# Patient Record
Sex: FEMALE | Race: OTHER | Employment: FULL TIME | URBAN - METROPOLITAN AREA
[De-identification: names, ages, dates, MRNs, and addresses within clinical notes are randomized per-mention and may not be internally consistent; named-entity substitution may affect disease eponyms.]

---

## 2021-07-19 PROCEDURE — 99283 EMERGENCY DEPT VISIT LOW MDM: CPT

## 2021-07-20 ENCOUNTER — APPOINTMENT (OUTPATIENT)
Dept: GENERAL RADIOLOGY | Age: 21
End: 2021-07-20

## 2021-07-20 ENCOUNTER — HOSPITAL ENCOUNTER (OUTPATIENT)
Age: 21
Setting detail: OBSERVATION
Discharge: HOME | End: 2021-07-20
Attending: EMERGENCY MEDICINE | Admitting: OBSTETRICS & GYNECOLOGY

## 2021-07-20 VITALS
DIASTOLIC BLOOD PRESSURE: 58 MMHG | RESPIRATION RATE: 18 BRPM | BODY MASS INDEX: 29.71 KG/M2 | HEART RATE: 78 BPM | OXYGEN SATURATION: 100 % | TEMPERATURE: 98.1 F | WEIGHT: 141.54 LBS | SYSTOLIC BLOOD PRESSURE: 101 MMHG | HEIGHT: 58 IN

## 2021-07-20 DIAGNOSIS — B00.9 HSV (HERPES SIMPLEX VIRUS) INFECTION: ICD-10-CM

## 2021-07-20 DIAGNOSIS — N73.9 FEMALE PELVIC INFLAMMATORY DISEASE: Primary | ICD-10-CM

## 2021-07-20 DIAGNOSIS — A41.9 SEPTICEMIA (HCC): ICD-10-CM

## 2021-07-20 PROBLEM — A60.04 HERPES, VULVAR: Status: ACTIVE | Noted: 2021-07-20

## 2021-07-20 LAB
A/G RATIO: 1.2 (ref 1.1–2.2)
ALBUMIN SERPL-MCNC: 4.2 G/DL (ref 3.4–5)
ALP BLD-CCNC: 78 U/L (ref 40–129)
ALT SERPL-CCNC: 19 U/L (ref 10–40)
ANION GAP SERPL CALCULATED.3IONS-SCNC: 10 MMOL/L (ref 3–16)
AST SERPL-CCNC: 20 U/L (ref 15–37)
BACTERIA WET PREP: NORMAL
BACTERIA: ABNORMAL /HPF
BASOPHILS ABSOLUTE: 0 K/UL (ref 0–0.2)
BASOPHILS RELATIVE PERCENT: 0.5 %
BILIRUB SERPL-MCNC: 0.3 MG/DL (ref 0–1)
BILIRUBIN URINE: NEGATIVE
BLOOD, URINE: ABNORMAL
BUN BLDV-MCNC: 12 MG/DL (ref 7–20)
C TRACH DNA GENITAL QL NAA+PROBE: NEGATIVE
CALCIUM SERPL-MCNC: 8.9 MG/DL (ref 8.3–10.6)
CHLORIDE BLD-SCNC: 101 MMOL/L (ref 99–110)
CLARITY: ABNORMAL
CLUE CELLS: NORMAL
CO2: 22 MMOL/L (ref 21–32)
COLOR: YELLOW
COMMENT UA: ABNORMAL
CREAT SERPL-MCNC: 0.6 MG/DL (ref 0.6–1.1)
EOSINOPHILS ABSOLUTE: 0 K/UL (ref 0–0.6)
EOSINOPHILS RELATIVE PERCENT: 0.3 %
EPITHELIAL CELLS WET PREP: NORMAL
EPITHELIAL CELLS, UA: ABNORMAL /HPF (ref 0–5)
GFR AFRICAN AMERICAN: >60
GFR NON-AFRICAN AMERICAN: >60
GLOBULIN: 3.5 G/DL
GLUCOSE BLD-MCNC: 97 MG/DL (ref 70–99)
GLUCOSE URINE: NEGATIVE MG/DL
HCG(URINE) PREGNANCY TEST: NEGATIVE
HCT VFR BLD CALC: 38.3 % (ref 36–48)
HEMOGLOBIN: 12.9 G/DL (ref 12–16)
KETONES, URINE: NEGATIVE MG/DL
LACTIC ACID, SEPSIS: 0.7 MMOL/L (ref 0.4–1.9)
LACTIC ACID, SEPSIS: 0.7 MMOL/L (ref 0.4–1.9)
LEUKOCYTE ESTERASE, URINE: ABNORMAL
LYMPHOCYTES ABSOLUTE: 1.3 K/UL (ref 1–5.1)
LYMPHOCYTES RELATIVE PERCENT: 16 %
MCH RBC QN AUTO: 28.4 PG (ref 26–34)
MCHC RBC AUTO-ENTMCNC: 33.7 G/DL (ref 31–36)
MCV RBC AUTO: 84.5 FL (ref 80–100)
MICROSCOPIC EXAMINATION: YES
MONOCYTES ABSOLUTE: 0.9 K/UL (ref 0–1.3)
MONOCYTES RELATIVE PERCENT: 11.2 %
N. GONORRHOEAE DNA: NEGATIVE
NEUTROPHILS ABSOLUTE: 5.7 K/UL (ref 1.7–7.7)
NEUTROPHILS RELATIVE PERCENT: 72 %
NITRITE, URINE: NEGATIVE
PDW BLD-RTO: 14.8 % (ref 12.4–15.4)
PH UA: 6 (ref 5–8)
PLATELET # BLD: 218 K/UL (ref 135–450)
PMV BLD AUTO: 8.4 FL (ref 5–10.5)
POTASSIUM REFLEX MAGNESIUM: 3.8 MMOL/L (ref 3.5–5.1)
PROTEIN UA: ABNORMAL MG/DL
RBC # BLD: 4.53 M/UL (ref 4–5.2)
RBC UA: ABNORMAL /HPF (ref 0–4)
RBC WET PREP: NORMAL
SARS-COV-2, NAAT: NOT DETECTED
SODIUM BLD-SCNC: 133 MMOL/L (ref 136–145)
SOURCE WET PREP: NORMAL
SPECIFIC GRAVITY UA: 1.03 (ref 1–1.03)
TOTAL PROTEIN: 7.7 G/DL (ref 6.4–8.2)
TRICHOMONAS PREP: NORMAL
URINE REFLEX TO CULTURE: YES
URINE TYPE: ABNORMAL
UROBILINOGEN, URINE: 0.2 E.U./DL
WBC # BLD: 8 K/UL (ref 4–11)
WBC UA: ABNORMAL /HPF (ref 0–5)
WBC WET PREP: NORMAL
YEAST WET PREP: NORMAL

## 2021-07-20 PROCEDURE — 87086 URINE CULTURE/COLONY COUNT: CPT

## 2021-07-20 PROCEDURE — 87040 BLOOD CULTURE FOR BACTERIA: CPT

## 2021-07-20 PROCEDURE — 81001 URINALYSIS AUTO W/SCOPE: CPT

## 2021-07-20 PROCEDURE — 87635 SARS-COV-2 COVID-19 AMP PRB: CPT

## 2021-07-20 PROCEDURE — 80053 COMPREHEN METABOLIC PANEL: CPT

## 2021-07-20 PROCEDURE — 6370000000 HC RX 637 (ALT 250 FOR IP): Performed by: OBSTETRICS & GYNECOLOGY

## 2021-07-20 PROCEDURE — 71046 X-RAY EXAM CHEST 2 VIEWS: CPT

## 2021-07-20 PROCEDURE — 84703 CHORIONIC GONADOTROPIN ASSAY: CPT

## 2021-07-20 PROCEDURE — 83605 ASSAY OF LACTIC ACID: CPT

## 2021-07-20 PROCEDURE — 87491 CHLMYD TRACH DNA AMP PROBE: CPT

## 2021-07-20 PROCEDURE — 6370000000 HC RX 637 (ALT 250 FOR IP): Performed by: EMERGENCY MEDICINE

## 2021-07-20 PROCEDURE — 87591 N.GONORRHOEAE DNA AMP PROB: CPT

## 2021-07-20 PROCEDURE — 6360000002 HC RX W HCPCS: Performed by: EMERGENCY MEDICINE

## 2021-07-20 PROCEDURE — 96372 THER/PROPH/DIAG INJ SC/IM: CPT

## 2021-07-20 PROCEDURE — 2580000003 HC RX 258: Performed by: EMERGENCY MEDICINE

## 2021-07-20 PROCEDURE — 6360000002 HC RX W HCPCS: Performed by: OBSTETRICS & GYNECOLOGY

## 2021-07-20 PROCEDURE — G0378 HOSPITAL OBSERVATION PER HR: HCPCS

## 2021-07-20 PROCEDURE — 87210 SMEAR WET MOUNT SALINE/INK: CPT

## 2021-07-20 PROCEDURE — 36415 COLL VENOUS BLD VENIPUNCTURE: CPT

## 2021-07-20 PROCEDURE — 2580000003 HC RX 258: Performed by: OBSTETRICS & GYNECOLOGY

## 2021-07-20 PROCEDURE — 96375 TX/PRO/DX INJ NEW DRUG ADDON: CPT

## 2021-07-20 PROCEDURE — 85025 COMPLETE CBC W/AUTO DIFF WBC: CPT

## 2021-07-20 PROCEDURE — 96365 THER/PROPH/DIAG IV INF INIT: CPT

## 2021-07-20 PROCEDURE — 96361 HYDRATE IV INFUSION ADD-ON: CPT

## 2021-07-20 PROCEDURE — 94760 N-INVAS EAR/PLS OXIMETRY 1: CPT

## 2021-07-20 RX ORDER — ONDANSETRON 2 MG/ML
4 INJECTION INTRAMUSCULAR; INTRAVENOUS ONCE
Status: COMPLETED | OUTPATIENT
Start: 2021-07-20 | End: 2021-07-20

## 2021-07-20 RX ORDER — ONDANSETRON 4 MG/1
4 TABLET, ORALLY DISINTEGRATING ORAL EVERY 8 HOURS PRN
Status: DISCONTINUED | OUTPATIENT
Start: 2021-07-20 | End: 2021-07-20 | Stop reason: HOSPADM

## 2021-07-20 RX ORDER — ONDANSETRON 2 MG/ML
4 INJECTION INTRAMUSCULAR; INTRAVENOUS EVERY 6 HOURS PRN
Status: DISCONTINUED | OUTPATIENT
Start: 2021-07-20 | End: 2021-07-20 | Stop reason: HOSPADM

## 2021-07-20 RX ORDER — ACETAMINOPHEN 650 MG/1
650 SUPPOSITORY RECTAL EVERY 4 HOURS PRN
Status: DISCONTINUED | OUTPATIENT
Start: 2021-07-20 | End: 2021-07-20 | Stop reason: HOSPADM

## 2021-07-20 RX ORDER — DOXYCYCLINE HYCLATE 100 MG
100 TABLET ORAL EVERY 12 HOURS SCHEDULED
Status: DISCONTINUED | OUTPATIENT
Start: 2021-07-20 | End: 2021-07-20 | Stop reason: HOSPADM

## 2021-07-20 RX ORDER — ACETAMINOPHEN 325 MG/1
650 TABLET ORAL EVERY 4 HOURS PRN
Status: DISCONTINUED | OUTPATIENT
Start: 2021-07-20 | End: 2021-07-20 | Stop reason: HOSPADM

## 2021-07-20 RX ORDER — DOXYCYCLINE HYCLATE 100 MG
100 TABLET ORAL ONCE
Status: COMPLETED | OUTPATIENT
Start: 2021-07-20 | End: 2021-07-20

## 2021-07-20 RX ORDER — SODIUM CHLORIDE, SODIUM LACTATE, POTASSIUM CHLORIDE, CALCIUM CHLORIDE 600; 310; 30; 20 MG/100ML; MG/100ML; MG/100ML; MG/100ML
INJECTION, SOLUTION INTRAVENOUS CONTINUOUS
Status: DISCONTINUED | OUTPATIENT
Start: 2021-07-20 | End: 2021-07-20 | Stop reason: HOSPADM

## 2021-07-20 RX ORDER — ACYCLOVIR 200 MG/1
400 CAPSULE ORAL ONCE
Status: COMPLETED | OUTPATIENT
Start: 2021-07-20 | End: 2021-07-20

## 2021-07-20 RX ORDER — ACYCLOVIR 800 MG/1
800 TABLET ORAL
Status: DISCONTINUED | OUTPATIENT
Start: 2021-07-20 | End: 2021-07-20 | Stop reason: HOSPADM

## 2021-07-20 RX ORDER — ACYCLOVIR 800 MG/1
800 TABLET ORAL
Qty: 50 TABLET | Refills: 0 | Status: SHIPPED | OUTPATIENT
Start: 2021-07-20 | End: 2021-07-30

## 2021-07-20 RX ORDER — ACETAMINOPHEN 325 MG/1
650 TABLET ORAL ONCE
Status: COMPLETED | OUTPATIENT
Start: 2021-07-20 | End: 2021-07-20

## 2021-07-20 RX ORDER — 0.9 % SODIUM CHLORIDE 0.9 %
1000 INTRAVENOUS SOLUTION INTRAVENOUS ONCE
Status: COMPLETED | OUTPATIENT
Start: 2021-07-20 | End: 2021-07-20

## 2021-07-20 RX ORDER — KETOROLAC TROMETHAMINE 30 MG/ML
15 INJECTION, SOLUTION INTRAMUSCULAR; INTRAVENOUS ONCE
Status: COMPLETED | OUTPATIENT
Start: 2021-07-20 | End: 2021-07-20

## 2021-07-20 RX ADMIN — DOXYCYCLINE HYCLATE 100 MG: 100 TABLET, COATED ORAL at 08:37

## 2021-07-20 RX ADMIN — ACYCLOVIR 800 MG: 800 TABLET ORAL at 11:09

## 2021-07-20 RX ADMIN — KETOROLAC TROMETHAMINE 15 MG: 30 INJECTION, SOLUTION INTRAMUSCULAR at 02:30

## 2021-07-20 RX ADMIN — ONDANSETRON 4 MG: 2 INJECTION INTRAMUSCULAR; INTRAVENOUS at 02:30

## 2021-07-20 RX ADMIN — ACETAMINOPHEN 650 MG: 325 TABLET ORAL at 02:30

## 2021-07-20 RX ADMIN — CEFOXITIN SODIUM 2000 MG: 2 POWDER, FOR SOLUTION INTRAVENOUS at 04:03

## 2021-07-20 RX ADMIN — SODIUM CHLORIDE 1000 ML: 9 INJECTION, SOLUTION INTRAVENOUS at 02:30

## 2021-07-20 RX ADMIN — SODIUM CHLORIDE, POTASSIUM CHLORIDE, SODIUM LACTATE AND CALCIUM CHLORIDE: 600; 310; 30; 20 INJECTION, SOLUTION INTRAVENOUS at 06:14

## 2021-07-20 RX ADMIN — ENOXAPARIN SODIUM 40 MG: 40 INJECTION SUBCUTANEOUS at 08:38

## 2021-07-20 RX ADMIN — ACYCLOVIR 800 MG: 800 TABLET ORAL at 06:17

## 2021-07-20 RX ADMIN — DOXYCYCLINE HYCLATE 100 MG: 100 TABLET, COATED ORAL at 04:07

## 2021-07-20 RX ADMIN — ACYCLOVIR 400 MG: 200 CAPSULE ORAL at 04:07

## 2021-07-20 ASSESSMENT — PAIN DESCRIPTION - PAIN TYPE: TYPE: ACUTE PAIN

## 2021-07-20 ASSESSMENT — PAIN SCALES - GENERAL
PAINLEVEL_OUTOF10: 0
PAINLEVEL_OUTOF10: 2
PAINLEVEL_OUTOF10: 5
PAINLEVEL_OUTOF10: 7

## 2021-07-20 ASSESSMENT — PAIN DESCRIPTION - DESCRIPTORS: DESCRIPTORS: HEADACHE

## 2021-07-20 ASSESSMENT — PAIN DESCRIPTION - LOCATION
LOCATION: HEAD
LOCATION: HEAD;PELVIS

## 2021-07-20 NOTE — H&P
Department of Gynecology  H&P          CHIEF COMPLAINT:   Fever    History obtained from patient    HISTORY OF PRESENT ILLNESS:     The patient is a 21 y.o. female with fever and pain. Started having fever and muscle aches 4 days ago. Pain worse when urinating. Denies abdominal pain/n/v. Has had increased vaginal discahrge over the same period of time. Past Medical History:    No past medical history on file. Past Surgical History:    No past surgical history on file. Past Gynecological History:    1. Last menstrual period:  2 weeks ago  2. Menses: interval:  4 weeks  3. Contraception: Condoms  4. Sexually transmitted disease history: none        A. Number of sexual partners in the last 6 months: 4    meds:  Current Facility-Administered Medications:     enoxaparin (LOVENOX) injection 40 mg, 40 mg, Subcutaneous, Daily, Eldon Jamil MD    ondansetron (ZOFRAN-ODT) disintegrating tablet 4 mg, 4 mg, Oral, Q8H PRN **OR** ondansetron (ZOFRAN) injection 4 mg, 4 mg, Intravenous, Q6H PRN, Eldon Jamil MD    acetaminophen (TYLENOL) tablet 650 mg, 650 mg, Oral, Q4H PRN, Eldon Jamil MD    cefOXitin (MEFOXIN) 2,000 mg in dextrose 5 % 50 mL IVPB, 2,000 mg, Intravenous, 4 times per day **AND** doxycycline hyclate (VIBRA-TABS) tablet 100 mg, 100 mg, Oral, 2 times per day, Eldon Jamil MD    lactated ringers infusion, , Intravenous, Continuous, Eldon Jamil MD    acetaminophen (TYLENOL) suppository 650 mg, 650 mg, Rectal, Q4H PRN, Eldon Jamil MD    acyclovir (ZOVIRAX) tablet 800 mg, 800 mg, Oral, 5x Daily, Eldon Jamil MD  No current outpatient medications on file. Allergies:  Patient has no known allergies. Social History:  TOBACCO:   reports that she has never smoked. She has never used smokeless tobacco.    Family History:   No family history on file.      PHYSICAL EXAM:    Vitals:  BP (!) 151/75   Pulse 114   Temp 103.1 °F (39.5 °C) (Oral)   Resp 19 Ht 4' 10\" (1.473 m)   Wt 145 lb (65.8 kg)   LMP 07/01/2021   SpO2 100%   BMI 30.31 kg/m²     CONSTITUTIONAL:  awake, alert, cooperative, no apparent distress, and appears stated age  LUNGS:  No increased work of breathing, good air exchange, clear to auscultation bilaterally, no crackles or wheezing  CARDIOVASCULAR:  Normal apical impulse, regular rate and rhythm, normal S1 and S2, no S3 or S4, and no murmur noted  ABDOMEN:  No scars, normal bowel sounds, soft, non-distended, non-tender, no masses palpated, no hepatosplenomegally  External Genitalia: Abnormal findings: labial lesions    DATA:  Recent Labs     07/20/21  0232   WBC 8.0   HGB 12.9   HCT 38.3        Recent Labs     07/20/21  0232   *   K 3.8      CO2 22   BUN 12   CREATININE 0.6   CALCIUM 8.9   AST 20   ALT 19         IMPRESSION/RECOMMENDATIONS:      Active Problems:    Herpes, vulvar  Plan: Will admit and monitor, continue with antibiotics.   Will use acyclovir

## 2021-07-20 NOTE — PROGRESS NOTES
Patient transferred to ICU bed 3911 by wheelchair. Admitted for observation for possible herpes vulvar with elevated body temp. Independent and ambulatory. A&Ox4. Continuous fluids infusing per order. See MAR. Sating 98% on RA. VSS. Call light within reach.

## 2021-07-20 NOTE — ED PROVIDER NOTES
EMERGENCY DEPARTMENT PROVIDER NOTE    Patient Identification  Pt Name: Lashay Hernandez  MRN: [id number]  [place] 2000  Date of evaluation: 7/19/2021  Provider: Susu Ontiveros DO  PCP: No primary care provider on file. Chief Complaint  Fever (fever, chills, headache that began thursday. Pt admits to unprotected sex and would like to be tested for std. Temp 103.1 in triage.)      HPI  (History provided by patient)  This is a 21 y.o. female otherwise healthy who was brought in by self for subjective fever and chills ongoing for the past 3 to 4 days. Associated with nausea, headaches and muscle aches. Patient also reports lower abdominal pain, vaginal discharge and painful ulcers in her groin. Nothing seems to make her symptoms any better or worse. Severity moderate. Patient currently sexually active. LMP 7/1/21. She denies any shortness of breath, chest pain, weakness or numbness. Has not been vaccinated for COVID-19. aSnta Avalos ROS    Const:  +fevers, +chills, no generalized weakness  Skin:  No rash, +lesions  Eyes:  No visual changes, no blurry or double vision, no pain  ENT:  No sore throat, no difficulty swallowing, no ear pain, no sinus pain or congestion  Card:  No chest pain, no palpitations, no edema  Resp:  No shortness of breath, +cough, no wheezing  Abd:  +abdominal pain, +nausea, no vomiting, no diarrhea  Genitourinary:  +dysuria, no hematuria, +vaginal discharge, no vaginal bleeding  MSK:  No joint pain, +myalgia  Neuro:  No focal weakness, +headache, no paresthesia    All other systems reviewed and negative unless otherwise noted in HPI      I have reviewed the following nursing documentation:  Allergies: Patient has no known allergies. Past medical history: No past medical history on file. Past surgical history: No past surgical history on file. Home medications: There are no discharge medications for this patient. Social history:  reports that she has never smoked.  She has never used smokeless tobacco. She reports previous alcohol use. She reports that she does not use drugs. Family history:  No family history on file. Exam  ED Triage Vitals [07/20/21 0002]   BP Temp Temp Source Pulse Resp SpO2 Height Weight   (!) 151/75 103.1 °F (39.5 °C) Oral 114 19 100 % 4' 10\" (1.473 m) 145 lb (65.8 kg)     Nursing note and vitals reviewed. Constitutional: Well developed, well nourished. Mildly ill-appearing, nontoxic  HENT:      Head: Normocephalic and atraumatic. Ears: External ears normal.      Nose: Nose normal.     Mouth: Membrane mucosa moist and pink. Eyes: Anicteric sclera. No discharge. Neck: Supple. Trachea midline. Cardiovascular: Tachycardia, regular rhythm; no murmurs, rubs, or gallops. Pulmonary/Chest: Effort normal. No respiratory distress. CTAB. No stridor. No wheezes. No rales. Abdominal: Soft. No distension. Tender to palpation in suprapubic region, no focal right lower quadrant tenderness, negative Rovsing. No CVA tenderness bilaterally. No rebound, no rigidity, no guarding. : External exam reveals multiple confluent broad-based tender ulcers along the labial majora most consistent with HSV. Speculum exam with edematous and friable cervix with purulent discharge. Blood noted in vaginal vault. Moderate cervical motion tenderness, no adnexal masses palpated. No retained foreign bodies. Rosenda VILLATORO as chaperone  Musculoskeletal: Moves all extremities. No gross deformity. Neurological: Alert and oriented. Face symmetric. Speech is clear. Skin: Warm and dry. No rash. Psychiatric: Normal mood and affect. Behavior is normal.      Radiology  XR CHEST (2 VW)   Final Result   No acute process.              Labs  Results for orders placed or performed during the hospital encounter of 07/20/21   COVID-19, Rapid    Specimen: Nasopharyngeal Swab   Result Value Ref Range    SARS-CoV-2, NAAT Not Detected Not Detected   Wet prep, genital    Specimen: Vaginal Result Value Ref Range    Trichomonas Prep None Seen     Yeast, Wet Prep None Seen     Clue Cells, Wet Prep None Seen     WBC, Wet Prep 1+     RBC, Wet Prep 3+     Epi Cells 1+     Bacteria 1+     Source Wet Prep Vaginal    Urine, reflex to culture    Specimen: Urine, clean catch   Result Value Ref Range    Color, UA YELLOW Straw/Yellow    Clarity, UA CLOUDY (A) Clear    Glucose, Ur Negative Negative mg/dL    Bilirubin Urine Negative Negative    Ketones, Urine Negative Negative mg/dL    Specific Gravity, UA 1.030 1.005 - 1.030    Blood, Urine MODERATE (A) Negative    pH, UA 6.0 5.0 - 8.0    Protein, UA TRACE (A) Negative mg/dL    Urobilinogen, Urine 0.2 <2.0 E.U./dL    Nitrite, Urine Negative Negative    Leukocyte Esterase, Urine MODERATE (A) Negative    Microscopic Examination YES     Urine Type NotGiven     Urine Reflex to Culture Yes    Pregnancy, urine   Result Value Ref Range    HCG(Urine) Pregnancy Test Negative Detects HCG level >20 MIU/mL   Microscopic Urinalysis   Result Value Ref Range    WBC, UA 10-20 (A) 0 - 5 /HPF    RBC, UA 3-4 0 - 4 /HPF    Epithelial Cells, UA 0-1 0 - 5 /HPF    Bacteria, UA Rare (A) None Seen /HPF    Urinalysis Comments see below    CBC Auto Differential   Result Value Ref Range    WBC 8.0 4.0 - 11.0 K/uL    RBC 4.53 4.00 - 5.20 M/uL    Hemoglobin 12.9 12.0 - 16.0 g/dL    Hematocrit 38.3 36.0 - 48.0 %    MCV 84.5 80.0 - 100.0 fL    MCH 28.4 26.0 - 34.0 pg    MCHC 33.7 31.0 - 36.0 g/dL    RDW 14.8 12.4 - 15.4 %    Platelets 331 488 - 886 K/uL    MPV 8.4 5.0 - 10.5 fL    Neutrophils % 72.0 %    Lymphocytes % 16.0 %    Monocytes % 11.2 %    Eosinophils % 0.3 %    Basophils % 0.5 %    Neutrophils Absolute 5.7 1.7 - 7.7 K/uL    Lymphocytes Absolute 1.3 1.0 - 5.1 K/uL    Monocytes Absolute 0.9 0.0 - 1.3 K/uL    Eosinophils Absolute 0.0 0.0 - 0.6 K/uL    Basophils Absolute 0.0 0.0 - 0.2 K/uL   Comprehensive Metabolic Panel w/ Reflex to MG   Result Value Ref Range    Sodium 133 (L) 136 - 145 mmol/L    Potassium reflex Magnesium 3.8 3.5 - 5.1 mmol/L    Chloride 101 99 - 110 mmol/L    CO2 22 21 - 32 mmol/L    Anion Gap 10 3 - 16    Glucose 97 70 - 99 mg/dL    BUN 12 7 - 20 mg/dL    CREATININE 0.6 0.6 - 1.1 mg/dL    GFR Non-African American >60 >60    GFR African American >60 >60    Calcium 8.9 8.3 - 10.6 mg/dL    Total Protein 7.7 6.4 - 8.2 g/dL    Albumin 4.2 3.4 - 5.0 g/dL    Albumin/Globulin Ratio 1.2 1.1 - 2.2    Total Bilirubin 0.3 0.0 - 1.0 mg/dL    Alkaline Phosphatase 78 40 - 129 U/L    ALT 19 10 - 40 U/L    AST 20 15 - 37 U/L    Globulin 3.5 g/dL   Lactate, Sepsis   Result Value Ref Range    Lactic Acid, Sepsis 0.7 0.4 - 1.9 mmol/L   Lactate, Sepsis   Result Value Ref Range    Lactic Acid, Sepsis 0.7 0.4 - 1.9 mmol/L       Screenings   [place] Coma Scale  Eye Opening: Spontaneous  Best Verbal Response: Oriented  Best Motor Response: Obeys commands  Carole Coma Scale Score: 15       MDM and ED Course    Patient febrile and mildly ill-appearing. No distress. COVID-19 swab negative. Chest x-ray without evidence of pneumonia, pneumothorax or other acute process. No peritoneal signs on abdominal exam, no focal findings to suggest acute appendicitis or cholecystitis and clinically these are felt highly unlikely. Urinalysis with potential infection. Lab work-up without evidence of endorgan dysfunction or clinically significant electrolyte normalities. Pelvic exam consistent with acute HSV infection, patient also with mucopurulent cervical discharge concerning for pelvic inflammatory disease. Patient does meet criteria for sepsis, not severe, not hypotensive or in shock. Patient received cefoxitin and doxycycline for treatment of PID. Also given acyclovir. Given patient's ill appearance and fevers felt prudent to place in observation for initial treatment.   I discussed case with Dr. Colni Jay for OB/GYN who is in agreement with current management plan, symptoms seem most consistent with primary HSV. Findings and plan discussed with patient she is agreeable. She remained alert and hemodynamically stable over her emergency department course. Final Impression  1. Female pelvic inflammatory disease    2. Septicemia ([address]    3. HSV (herpes simplex virus) infection        Blood pressure 96/76, pulse 80, temperature 97.2 °F (36.2 °C), temperature source Temporal, resp. rate 16, height 4' 10\" (1.473 m), weight 141 lb 8.6 oz (64.2 kg), last menstrual period 07/01/2021, SpO2 98 %. Disposition:  DISPOSITION Admitted 07/20/2021 04:56:31 AM      Patient Referrals:  No follow-up provider specified. Discharge Medications: There are no discharge medications for this patient. Discontinued Medications: There are no discharge medications for this patient. This chart was generated using the [address] [place] [address] dictation system. I created this record but it may contain dictation errors given the limitations of this technology.     Micah Nicholas DO (electronically signed)  Attending Emergency Physician       Micah Nicholas DO  07/20/21 DO Rupali  07/20/21 2023

## 2021-07-20 NOTE — PROGRESS NOTES
Patient discharged to home. Discharge teaching and scripts provided, and education on zovirax. Patient states understanding. All questions answered.

## 2021-07-20 NOTE — DISCHARGE INSTR - COC
Continuity of Care Form    Patient Name: Stephen Torres   :  2000  MRN:  [id number]    Admit date:  2021  Discharge date:  ***    Code Status Order: Full Code   Advance Directives:     Admitting Physician:  Catherine Bergeron MD  PCP: No primary care provider on file. Discharging Nurse: [facility] Unit/Room#: KQB-6224/6781-36  Discharging Unit Phone Number: ***    Emergency Contact:   Extended Emergency Contact Information  Primary Emergency Contact: Melissa Saba, [address] Phone: [phone]  Relation: Other  Preferred language: English   needed? No  Secondary Emergency Contact: Refugio Mcconnell  Home Phone: [phone]  Relation: Other  Preferred language: English   needed? No    Past Surgical History:  No past surgical history on file. Immunization History: There is no immunization history on file for this patient.     Active Problems:  Patient Active Problem List   Diagnosis Code    Herpes, vulvar A60.04       Isolation/Infection:   Isolation          No Isolation        Patient Infection Status     Infection Onset Added Last Indicated Last Indicated By Review Planned Expiration Resolved Resolved By    None active    Resolved    COVID-19 Rule Out 21 COVID-19, Rapid (Ordered)   21 Rule-Out Test Resulted          Nurse Assessment:  Last Vital Signs: BP (!) 101/58   Pulse 78   Temp 98.1 °F (36.7 °C) (Oral)   Resp 18   Ht 4' 10\" (1.473 m)   Wt 141 lb 8.6 oz (64.2 kg)   LMP 2021   SpO2 100%   BMI 29.58 kg/m²     Last documented pain score (0-10 scale): Pain Level: 0  Last Weight:   Wt Readings from Last 1 Encounters:   21 141 lb 8.6 oz (64.2 kg)     Mental Status:  {IP PT MENTAL STATUS:}    IV Access:  { AMARJIT IV ACCESS:889412169}    Nursing Mobility/ADLs:  Walking   {CHP DME NRXF:014928373}  Transfer  {CHP DME HPJI:794072585}  Bathing  {CHP DME OZHV:442630143}  Dressing  {CHP DME BRIGID:615774192}  Toileting  {CHP DME FKZA:523810527}  Feeding  {[facility] DME BQQX:111188036}  Med Admin  {P DME HSCZ:386978530}  Med Delivery   { AMARJIT MED Delivery:065370430}    Wound Care Documentation and Therapy:        Elimination:  Continence:   · Bowel: {YES / WY:92012}  · Bladder: {YES / ZK:16673}  Urinary Catheter: {Urinary Catheter:007616120}   Colostomy/Ileostomy/Ileal Conduit: {YES / OY:31583}       Date of Last BM: ***  No intake or output data in the 24 hours ending 21 1051  No intake/output data recorded.     Safety Concerns:     [address] Safety Concerns:584069569}    Impairments/Disabilities:      [address] Impairments/Disabilities:764290344}    Nutrition Therapy:  Current Nutrition Therapy:   [address] Diet List:749928179}    Routes of Feeding: {[facility] DME Other Feedings:127413791}  Liquids: {Slp liquid thickness:37987}  Daily Fluid Restriction: {P DME Yes amt example:925320791}  Last Modified Barium Swallow with Video (Video Swallowing Test): {Done Not Done EWTU:557422890}    Treatments at the Time of Hospital Discharge:   Respiratory Treatments: ***  Oxygen Therapy:  {Therapy; copd oxygen:57052}  Ventilator:    { CC Vent ZXJV:438737269}    Rehab Therapies: {THERAPEUTIC INTERVENTION:5075628470}  Weight Bearing Status/Restrictions: [address]  Weight Bearin}  Other Medical Equipment (for information only, NOT a DME order):  {EQUIPMENT:527091724}  Other Treatments: ***    Patient's personal belongings (please select all that are sent with patient):  {[facility] DME Belongings:929495250}    RN SIGNATURE:  {Esignature:512900102}    CASE MANAGEMENT/SOCIAL WORK SECTION    Inpatient Status Date: ***    Readmission Risk Assessment Score:  Readmission Risk              Risk of Unplanned Readmission:  0           Discharging to Facility/ Agency   · Name:   · Address:  · Phone:  · Fax:    Dialysis Facility (if applicable)   · Name:  · Address:  · Dialysis Schedule:  · Phone:  · Fax:    / signature: {Esignature:644397413}    PHYSICIAN SECTION    Prognosis: {Prognosis:6501036075}    Condition at Discharge: [address] Patient Condition:308903903}    Rehab Potential (if transferring to Rehab): {Prognosis:8412778268}    Recommended Labs or Other Treatments After Discharge: ***    Physician Certification: I certify the above information and transfer of Mayito Bucio  is necessary for the continuing treatment of the diagnosis listed and that she requires {Admit to Appropriate Level of Care:08599} for {GREATER/LESS:488484262} 30 days.      Update Admission H&P: {CHP DME Changes in HAKSU:428306479}    PHYSICIAN SIGNATURE:  {Esignature:025413968}

## 2021-07-20 NOTE — DISCHARGE SUMMARY
Physician Discharge Summary     Patient ID:  Qi Al  [id number]  4 y.o.  2000    Admit date: 7/20/2021    Discharge date:  7/20    Admitting Physician: Stephania Antonio MD    Discharge Diagnoses: Herpes, vulvar [A60.04]    Discharged Condition: STABLE    Procedures Performed: none    Hospital Course: Patient was admitted for fever and vulvar lesions. Rapidly became afebrile and vulvar pain improved. Discharge Exam:  Appears well, AVSS, abdomen soft,     Disposition: Good    Patient Instructions: Activity:as tolerated.    Complete 10 day course of acyclovir    Diet: Regular    Wound Care: Keep wound clean and dry    Discharge Medication:    Thomas Rizzo [facility]   Home Medication Instructions WYATT:[id number]    Printed on:07/20/21 1032   Medication Information                      acyclovir (ZOVIRAX) 800 MG tablet  Take 1 tablet by mouth 5 times daily for 10 days                  Follow-up with Stephania Antonio MD in 1-2 weeks    [phone]    Signed:  Stephania Antonio MD  7/20/2021  10:32 AM

## 2021-07-21 LAB — URINE CULTURE, ROUTINE: NORMAL

## 2021-07-24 LAB
BLOOD CULTURE, ROUTINE: NORMAL
CULTURE, BLOOD 2: NORMAL